# Patient Record
Sex: FEMALE | Race: WHITE | Employment: FULL TIME | URBAN - METROPOLITAN AREA
[De-identification: names, ages, dates, MRNs, and addresses within clinical notes are randomized per-mention and may not be internally consistent; named-entity substitution may affect disease eponyms.]

---

## 2017-11-21 ENCOUNTER — TELEPHONE (OUTPATIENT)
Dept: NEUROLOGY | Age: 47
End: 2017-11-21

## 2017-11-21 ENCOUNTER — OFFICE VISIT (OUTPATIENT)
Dept: NEUROLOGY | Age: 47
End: 2017-11-21
Payer: COMMERCIAL

## 2017-11-21 VITALS
DIASTOLIC BLOOD PRESSURE: 69 MMHG | BODY MASS INDEX: 38.61 KG/M2 | HEIGHT: 62 IN | SYSTOLIC BLOOD PRESSURE: 115 MMHG | HEART RATE: 68 BPM | WEIGHT: 209.8 LBS

## 2017-11-21 DIAGNOSIS — G35 MULTIPLE SCLEROSIS (HCC): Primary | ICD-10-CM

## 2017-11-21 DIAGNOSIS — H46.9 OPTIC NEURITIS: ICD-10-CM

## 2017-11-21 DIAGNOSIS — G43.009 MIGRAINE WITHOUT AURA AND WITHOUT STATUS MIGRAINOSUS, NOT INTRACTABLE: ICD-10-CM

## 2017-11-21 PROCEDURE — 99214 OFFICE O/P EST MOD 30 MIN: CPT | Performed by: NURSE PRACTITIONER

## 2017-11-21 RX ORDER — PREDNISONE 20 MG/1
TABLET ORAL
Qty: 18 TABLET | Refills: 0 | Status: SHIPPED | OUTPATIENT
Start: 2017-11-21

## 2017-11-21 RX ORDER — METHYLPREDNISOLONE, MICRONIZED 100 %
POWDER (GRAM) MISCELLANEOUS
Qty: 3 G | Refills: 0 | Status: SHIPPED | OUTPATIENT
Start: 2017-11-21

## 2017-11-21 NOTE — LETTER
MUSCULOSKELETAL Neck pain: absent, Back pain: absent, Stiffness: absent, Muscle pain: absent, Joint pain: absent Restless legs: absent   DERMATOLOGIC Hair loss: absent, Skin changes: absent   NEUROLOGIC Memory loss: absent, Confusion: absent, Seizures: absent Trouble walking or imbalance: absent, Dizziness: absent, Weakness: absent, Numbness: present Tremor: absent, Spasm: absent, Speech difficulty: absent, Headache: absent, Light sensitivity: present   PSYCHIATRIC Anxiety: absent, Hallucination: absent, Mood disorder: absent   HEMATOLOGIC Abnormal bleeding: absent, Anemia: absent, Clotting disorder: absent, Lymph gland changes: absent           Allergies   Allergen Reactions    Prochlorperazine Edisylate Other (See Comments)     LOCK JAW           Current Outpatient Prescriptions   Medication Sig Dispense Refill    dimethyl fumarate (TECFIDERA) 240 MG delayed release capsule Take 1 capsule by mouth 2 times daily 60 capsule 11    Multiple Vitamins-Minerals (THRIVE FOR LIFE WOMENS PO) Take by mouth daily Pill, shake and patch      Lysine 1000 MG TABS Take 1 tablet by mouth as needed       Omeprazole Magnesium (PRILOSEC OTC PO) Take  by mouth as needed. No current facility-administered medications for this visit. PHYSICAL EXAMINATION       There were no vitals filed for this visit. .                                                                                                    General Appearance:  Alert, cooperative, no signs of distress, appears stated age   Head:  Normocephalic, no signs of trauma   Eyes:  Conjunctiva/corneas clear;  eyelids intact.   Right optic disc pallor   Ears:  Normal external ear and canals   Nose: Nares normal, mucosa normal, no drainage    Throat: Lips and tongue normal; teeth normal;  gums normal   Neck: Supple, intact flexion, extension and rotation;   trachea midline;

## 2017-11-21 NOTE — PROGRESS NOTES
2014    - MRI of cervical spine with plaque within cord at C2-3 level, November 2006. -CSF analysis 4 WBC,0 RBC,total protein 43,glucose 57, oligoclonal bands negative ,IgG synthesis 11.2(-9.9-+3). - JOSE normal, LISA no potential OD . -Interferon neutralizing Ab <20.  B12 347, C reactive protein 7.9 (0-5) , folic acid 6.8 , DMS4U 6.0 , SPEP borderline polyclonal hypergammaglobunemia , ESR 10 , QUEENIE negative     Past Medical History:   Diagnosis Date    Arthritis     Depression     Migraine without aura, without mention of intractable migraine without mention of status migrainosus     Multiple sclerosis ([facility] [address]     Optic neuritis, unspecified          Past Surgical History:   Procedure Laterality Date    SHOULDER SURGERY      bilateral rotator cuff repair    TONSILLECTOMY         Family History   Problem Relation Age of Onset    Diabetes Father     Diabetes Paternal Uncle     Diabetes Paternal Grandmother     Heart Disease Paternal Grandmother        Social History   Substance Use Topics    Smoking status: Former Smoker    Smokeless tobacco: Never Used      Comment: social smoker in past    Alcohol use Yes      Comment: occasional use                               REVIEW OF SYSTEMS    CONSTITUTIONAL Weight: absent, Appetite: absent, Fatigue: present      HEENT Ears: normal, Visual disturbance: present   RESPIRATORY Shortness of breath: absent, Cough: absent   CARDIOVASCULAR Chest pain: absent, Leg swelling :absent      GI Constipation: absent, Diarrhea: absent, Swallowing change: absent       Urinary frequency: present, Urinary urgency: absent, Urinary incontinence: absent   MUSCULOSKELETAL Neck pain: absent, Back pain: absent, Stiffness: absent, Muscle pain: absent, Joint pain: absent Restless legs: absent   DERMATOLOGIC Hair loss: absent, Skin changes: absent   NEUROLOGIC Memory loss: absent, Confusion: absent, Seizures: absent Trouble walking or imbalance: absent, Dizziness: absent, Weakness: absent, Numbness: present Tremor: absent, Spasm: absent, Speech difficulty: absent, Headache: absent, Light sensitivity: present   PSYCHIATRIC Anxiety: absent, Hallucination: absent, Mood disorder: absent   HEMATOLOGIC Abnormal bleeding: absent, Anemia: absent, Clotting disorder: absent, Lymph gland changes: absent           Allergies   Allergen Reactions    Prochlorperazine Edisylate Other (See Comments)     LOCK JAW           Current Outpatient Prescriptions   Medication Sig Dispense Refill    dimethyl fumarate (TECFIDERA) 240 MG delayed release capsule Take 1 capsule by mouth 2 times daily 60 capsule 11    Multiple Vitamins-Minerals (THRIVE FOR LIFE WOMENS PO) Take by mouth daily Pill, shake and patch      Lysine 1000 MG TABS Take 1 tablet by mouth as needed       Omeprazole Magnesium (PRILOSEC OTC PO) Take  by mouth as needed. No current facility-administered medications for this visit. PHYSICAL EXAMINATION       There were no vitals filed for this visit. .                                                                                                    General Appearance:  Alert, cooperative, no signs of distress, appears stated age   Head:  Normocephalic, no signs of trauma   Eyes:  Conjunctiva/corneas clear;  eyelids intact.   Right optic disc pallor   Ears:  Normal external ear and canals   Nose: Nares normal, mucosa normal, no drainage    Throat: Lips and tongue normal; teeth normal;  gums normal   Neck: Supple, intact flexion, extension and rotation;   trachea midline;  no adenopathy;   thyroid: not enlarged;   no carotid pulse abnormality   Back:   Symmetric, no curvature, ROM adequate   Lungs:   Respirations unlabored   Heart:  Regular rate and rhythm           Extremities: Extremities normal, no cyanosis, no edema   Pulses: Symmetric over head and neck   Skin: Skin color, texture normal, no rashes, no lesions NEUROLOGIC EXAMINATION    Neurologic Exam     Mental Status   Oriented to person, place, and time. Attention: normal.   Speech: speech is normal   Level of consciousness: alert  Normal comprehension. Cranial Nerves     CN II   Visual fields full to confrontation. CN III, IV, VI   Pupils are equal, round, and reactive to light. Extraocular motions are normal.     CN V   Facial sensation intact. CN VII   Facial expression full, symmetric. CN VIII   CN VIII normal.     CN IX, X   CN IX normal.     CN XI   CN XI normal.     CN XII   CN XII normal.     Motor Exam   Muscle bulk: normal  Overall muscle tone: normal  Right arm pronator drift: absent    Strength   Strength 5/5 throughout. Sensory Exam   Light touch normal.   Vibration normal.   Pinprick normal.     Gait, Coordination, and Reflexes     Gait  Gait: normal    Coordination   Finger to nose coordination: normal  Heel to shin coordination: normal    Tremor   Resting tremor: absent    Reflexes   Right brachioradialis: 2+  Left brachioradialis: 2+  Right biceps: 2+  Left biceps: 2+  Right triceps: 2+  Left triceps: 2+  Right patellar: 1+  Left patellar: 2+  Right achilles: 1+  Left achilles: 2+  Right plantar: normal  Left plantar: normal  Right ankle clonus: absent  Left ankle clonus: absent            ASSESSMENT/PLAN:       In summary, your patient, Kaylah Betancur exhibits the following, with associated plan:    1. Right optic neuritis, confirmed by patient's neuro-ophthalmologist and possible exacerbation of relapsing remitting multiple sclerosis, with new sensory and gait symptoms  1. We will obtain an MRI of the brain with and without contrast with special attention to her optic nerve as well as an MRI of the cervical spine, as patient did have evidence of cervical spine involvement in 2006.  2. We will administer Solu-Medrol 1 g ×3 days orally, followed by a prednisone taper.   3. She will take Pepcid as a GI prophylactic  4. She has an appointment with Dr. Conrado Gomez in 3 weeks, and will follow up with him regarding her new symptoms and MRIs  5. For now, she will remain on Tecfidera 240 mg twice daily.   She will need annual liver function studies and CBC at her routine visit            Signed: Rand Barnes CNP

## 2017-11-27 DIAGNOSIS — G35 MULTIPLE SCLEROSIS (HCC): ICD-10-CM

## 2017-11-27 RX ORDER — DIMETHYL FUMARATE 240 MG/1
240 CAPSULE ORAL 2 TIMES DAILY
Qty: 60 CAPSULE | Status: SHIPPED | OUTPATIENT
Start: 2017-11-27 | End: 2018-11-27

## 2017-12-02 ENCOUNTER — HOSPITAL ENCOUNTER (OUTPATIENT)
Dept: MRI IMAGING | Age: 47
Discharge: HOME | End: 2017-12-02
Payer: COMMERCIAL

## 2017-12-02 DIAGNOSIS — G35 MULTIPLE SCLEROSIS (HCC): ICD-10-CM

## 2017-12-02 PROCEDURE — 72156 MRI NECK SPINE W/O & W/DYE: CPT

## 2017-12-02 PROCEDURE — A9579 GAD-BASE MR CONTRAST NOS,1ML: HCPCS | Performed by: NURSE PRACTITIONER

## 2017-12-02 PROCEDURE — 70553 MRI BRAIN STEM W/O & W/DYE: CPT

## 2017-12-02 PROCEDURE — 6360000004 HC RX CONTRAST MEDICATION: Performed by: NURSE PRACTITIONER

## 2017-12-02 RX ADMIN — GADOTERIDOL 20 ML: 279.3 INJECTION, SOLUTION INTRAVENOUS at 08:15

## 2017-12-11 ENCOUNTER — OFFICE VISIT (OUTPATIENT)
Dept: NEUROLOGY | Age: 47
End: 2017-12-11
Payer: COMMERCIAL

## 2017-12-11 VITALS
HEIGHT: 62 IN | SYSTOLIC BLOOD PRESSURE: 99 MMHG | WEIGHT: 209.2 LBS | BODY MASS INDEX: 38.5 KG/M2 | DIASTOLIC BLOOD PRESSURE: 67 MMHG | HEART RATE: 92 BPM

## 2017-12-11 DIAGNOSIS — G43.009 MIGRAINE WITHOUT AURA AND WITHOUT STATUS MIGRAINOSUS, NOT INTRACTABLE: ICD-10-CM

## 2017-12-11 DIAGNOSIS — G35 MULTIPLE SCLEROSIS (HCC): Primary | ICD-10-CM

## 2017-12-11 PROCEDURE — 99214 OFFICE O/P EST MOD 30 MIN: CPT | Performed by: PSYCHIATRY & NEUROLOGY

## 2017-12-11 RX ORDER — DIMETHYL FUMARATE 240 MG/1
240 CAPSULE ORAL 2 TIMES DAILY
Qty: 60 CAPSULE | Refills: 11 | Status: SHIPPED | OUTPATIENT
Start: 2017-12-11 | End: 2022-04-25 | Stop reason: ALTCHOICE

## 2017-12-11 ASSESSMENT — ENCOUNTER SYMPTOMS
RESPIRATORY NEGATIVE: 1
ALLERGIC/IMMUNOLOGIC NEGATIVE: 1
GASTROINTESTINAL NEGATIVE: 1

## 2017-12-11 NOTE — LETTER
OD . Interferon neutralizing Ab <20. B12 347, C reactive protein 7.9 (0-5) , folic acid 6.8 , HFN1U 6.0 , SPEP borderline polyclonal hypergammaglobunemia , ESR 10 , QUEENIE negative , November 2014 MRI of Head with stable multifocal scattered T2/FLAIR hyperintensities instable appearing periventricular along with subcortical white matter nonenhancing, December 2017 . MRI of cervical spine with foci of abnormal cord signal C2-3  disc level and C6 vertebral body level and T3 level . There is multi level degenerative spondylosis , December 2017       Past Medical History:   Diagnosis Date    Arthritis     Depression     Migraine without aura, without mention of intractable migraine without mention of status migrainosus     [facility] sclerosis ([facility] [address]     Optic neuritis, unspecified        Past Surgical History:   Procedure Laterality Date    SHOULDER SURGERY      bilateral rotator cuff repair    TONSILLECTOMY         Family History   Problem Relation Age of Onset    Diabetes Father     Diabetes Paternal Uncle     Diabetes Paternal Grandmother     Heart Disease Paternal Grandmother        Social History     Social History    Marital status:      Spouse name: N/A    Number of children: N/A    Years of education: N/A     Social History Main Topics    Smoking status: Former Smoker    Smokeless tobacco: Never Used      Comment: social smoker in past    Alcohol use Yes      Comment: occasional use    Drug use: No    Sexual activity: Not Asked     Other Topics Concern    None     Social History Narrative    None       Current Outpatient Prescriptions   Medication Sig Dispense Refill    dimethyl fumarate (TECFIDERA) 240 MG delayed release capsule Take 1 capsule by mouth 2 times daily 60 capsule 11    TECFIDERA 240 MG delayed release capsule TAKE 1 CAPSULE BY MOUTH 2 TIMES DAILY 60 capsule PRN    MethylPREDNISolone POWD 1000 mg qd x 3 days. Put in 250 mg. per capsule.  3 g 0  predniSONE (DELTASONE) 20 MG tablet 3 qd for 3 days, 2 qd x3 days, 1 qd x 3 day 18 tablet 0    Multiple Vitamins-Minerals (THRIVE FOR LIFE WOMENS PO) Take by mouth daily Pill, shake and patch      Lysine 1000 MG TABS Take 1 tablet by mouth as needed       Omeprazole Magnesium (PRILOSEC OTC PO) Take  by mouth as needed. No current facility-administered medications for this visit. Allergies   Allergen Reactions    Prochlorperazine Edisylate Other (See Comments)     LOCK JAW           Review of Systems   Constitutional: Negative. HENT: Negative. Eyes: Positive for visual disturbance. Respiratory: Negative. Cardiovascular: Negative. Gastrointestinal: Negative. Endocrine: Negative. Genitourinary: Positive for frequency. Musculoskeletal: Negative. Skin: Negative. Allergic/Immunologic: Negative. Neurological: Positive for numbness. Hematological: Negative. Psychiatric/Behavioral: Negative. Objective:   Physical Exam    Vitals:    12/11/17 1242   BP: 99/67   Pulse: 92       Neurological Examination  Constitutional .General exam well groomed   Ears /Nose/Throat: external ear . Normal exam  Neck and thyroid . Normal size  Respiratory . Breathsounds clear bilaterally  Cardiovascular: Auscultation of heart with regular rate and rhythm and normal heart sounds  Musculoskeletal. Muscle bulk and tone normal                                                                 Muscle strength 5/5 strength throughout                                                                                 No dysmetria or dysdiadokinesis  No tremor   Normal fine motor  Gait normal   Orientation Alert and oriented x 3   Language process and speech normal . No aphasia   Cranial nerve 2 acuety left eye 20/20, right eye 20/25 and visual fields normal   Cranial nerve 3, 4 and 6 . Extraocular muscles are intact .  Pupils are equal and reactive

## 2017-12-11 NOTE — PROGRESS NOTES
spondylosis , December 2017       Past Medical History:   Diagnosis Date    Arthritis     Depression     Migraine without aura, without mention of intractable migraine without mention of status migrainosus     Multiple sclerosis ([facility] [address]     Optic neuritis, unspecified        Past Surgical History:   Procedure Laterality Date    SHOULDER SURGERY      bilateral rotator cuff repair    TONSILLECTOMY         Family History   Problem Relation Age of Onset    Diabetes Father     Diabetes Paternal Uncle     Diabetes Paternal Grandmother     Heart Disease Paternal Grandmother        Social History     Social History    Marital status:      Spouse name: N/A    Number of children: N/A    Years of education: N/A     Social History Main Topics    Smoking status: Former Smoker    Smokeless tobacco: Never Used      Comment: social smoker in past    Alcohol use Yes      Comment: occasional use    Drug use: No    Sexual activity: Not Asked     Other Topics Concern    None     Social History Narrative    None       Current Outpatient Prescriptions   Medication Sig Dispense Refill    dimethyl fumarate (TECFIDERA) 240 MG delayed release capsule Take 1 capsule by mouth 2 times daily 60 capsule 11    TECFIDERA 240 MG delayed release capsule TAKE 1 CAPSULE BY MOUTH 2 TIMES DAILY 60 capsule PRN    MethylPREDNISolone POWD 1000 mg qd x 3 days. Put in 250 mg. per capsule. 3 g 0    predniSONE (DELTASONE) 20 MG tablet 3 qd for 3 days, 2 qd x3 days, 1 qd x 3 day 18 tablet 0    Multiple Vitamins-Minerals (THRIVE FOR LIFE WOMENS PO) Take by mouth daily Pill, shake and patch      Lysine 1000 MG TABS Take 1 tablet by mouth as needed       Omeprazole Magnesium (PRILOSEC OTC PO) Take  by mouth as needed. No current facility-administered medications for this visit.         Allergies   Allergen Reactions    Prochlorperazine Edisylate Other (See Comments)     LOCK JAW           Review of Systems   Constitutional: Negative. HENT: Negative. Eyes: Positive for visual disturbance. Respiratory: Negative. Cardiovascular: Negative. Gastrointestinal: Negative. Endocrine: Negative. Genitourinary: Positive for frequency. Musculoskeletal: Negative. Skin: Negative. Allergic/Immunologic: Negative. Neurological: Positive for numbness. Hematological: Negative. Psychiatric/Behavioral: Negative. Objective:   Physical Exam    Vitals:    12/11/17 1242   BP: 99/67   Pulse: 92       Neurological Examination  Constitutional .General exam well groomed   Ears /Nose/Throat: external ear . Normal exam  Neck and thyroid . Normal size  Respiratory . Breathsounds clear bilaterally  Cardiovascular: Auscultation of heart with regular rate and rhythm and normal heart sounds  Musculoskeletal. Muscle bulk and tone normal                                                                 Muscle strength 5/5 strength throughout                                                                                 No dysmetria or dysdiadokinesis  No tremor   Normal fine motor  Gait normal   Orientation Alert and oriented x 3   Language process and speech normal . No aphasia   Cranial nerve 2 acuety left eye 20/20, right eye 20/25 and visual fields normal   Cranial nerve 3, 4 and 6 . Extraocular muscles are intact . Pupils are equal and reactive   Cranial nerve 5 . Normal strength of masseter and temporalis . Intact corneal reflex. Normal facial sensation  Cranial nerve 7 normal exam   Cranial nerve 8. Grossly intact hearing   Cranial nerve 9 and 10. Symmetric palate elevation   Cranial nerve 11 , 5 out of 5 strength   Cranial Nerve 12 midline tongue . No atrophy  Sensation . Normal proprioception . Intact Vibration . Normal pinprick and light touch   Deep Tendon Reflexes normal  Plantar response flexor bilaterally      Assessment:       1. Multiple sclerosis ([address]    2.  Migraine without aura and without status migrainosus, not intractable    Patient has had rght eye optic neuritis on tecfidera resolved with medrol pulse therapy .  Patient is to get UA but also would like her assessed by [facility] at [facility] not able to go to [facility] again as per her insurance with some new plaques on MRI of cervical spine and excacerbation of optic neuritis on tecfidera      Plan:         Orders Placed This Encounter   Procedures    Urinalysis     with urine C&S    External Referral To Neurology     Referral Priority:   Routine     Referral Type:   Consult for Advice and Opinion     Referral Reason:   Specialty Services Required     Requested Specialty:   Neurology     Number of Visits Requested:   1

## 2017-12-13 NOTE — COMMUNICATION BODY
Negative. HENT: Negative. Eyes: Positive for visual disturbance. Respiratory: Negative. Cardiovascular: Negative. Gastrointestinal: Negative. Endocrine: Negative. Genitourinary: Positive for frequency. Musculoskeletal: Negative. Skin: Negative. Allergic/Immunologic: Negative. Neurological: Positive for numbness. Hematological: Negative. Psychiatric/Behavioral: Negative. Objective:   Physical Exam    Vitals:    12/11/17 1242   BP: 99/67   Pulse: 92       Neurological Examination  Constitutional .General exam well groomed   Ears /Nose/Throat: external ear . Normal exam  Neck and thyroid . Normal size  Respiratory . Breathsounds clear bilaterally  Cardiovascular: Auscultation of heart with regular rate and rhythm and normal heart sounds  Musculoskeletal. Muscle bulk and tone normal                                                                 Muscle strength 5/5 strength throughout                                                                                 No dysmetria or dysdiadokinesis  No tremor   Normal fine motor  Gait normal   Orientation Alert and oriented x 3   Language process and speech normal . No aphasia   Cranial nerve 2 acuety left eye 20/20, right eye 20/25 and visual fields normal   Cranial nerve 3, 4 and 6 . Extraocular muscles are intact . Pupils are equal and reactive   Cranial nerve 5 . Normal strength of masseter and temporalis . Intact corneal reflex. Normal facial sensation  Cranial nerve 7 normal exam   Cranial nerve 8. Grossly intact hearing   Cranial nerve 9 and 10. Symmetric palate elevation   Cranial nerve 11 , 5 out of 5 strength   Cranial Nerve 12 midline tongue . No atrophy  Sensation . Normal proprioception . Intact Vibration . Normal pinprick and light touch   Deep Tendon Reflexes normal  Plantar response flexor bilaterally      Assessment:       1. Multiple sclerosis ([address]    2.  Migraine without aura and without status migrainosus, not

## 2022-03-02 ENCOUNTER — OFFICE VISIT (OUTPATIENT)
Dept: NEUROLOGY | Age: 52
End: 2022-03-02
Payer: COMMERCIAL

## 2022-03-02 VITALS
HEIGHT: 63 IN | DIASTOLIC BLOOD PRESSURE: 82 MMHG | BODY MASS INDEX: 41.64 KG/M2 | HEART RATE: 77 BPM | TEMPERATURE: 97.2 F | SYSTOLIC BLOOD PRESSURE: 129 MMHG | WEIGHT: 235 LBS

## 2022-03-02 DIAGNOSIS — G35 MULTIPLE SCLEROSIS (HCC): Primary | ICD-10-CM

## 2022-03-02 DIAGNOSIS — N31.9 NEUROGENIC BLADDER: ICD-10-CM

## 2022-03-02 PROCEDURE — 99204 OFFICE O/P NEW MOD 45 MIN: CPT | Performed by: PSYCHIATRY & NEUROLOGY

## 2022-03-02 ASSESSMENT — ENCOUNTER SYMPTOMS
GASTROINTESTINAL NEGATIVE: 1
ALLERGIC/IMMUNOLOGIC NEGATIVE: 1
RESPIRATORY NEGATIVE: 1

## 2022-03-02 NOTE — PROGRESS NOTES
Subjective:      Patient ID: Honey Knight is a 46 y.o. female. HPI  Active Problem relapsing remitting multiple sclerosis with prior optic neuritis having been on tecfidera having gotten [facility] second opinion last seen in December 2017 having folowed at [facility] with Dr Lexy Santoyo . There are also common migraine headaches. The condition is she was taken off the tecfidera in March of 2020 do to very low total lymphocyte count going as as low as 0.2 with with las total lymphocyte count being 0.6 in October 2021 . There was discussion at [facility]  to switch over to ocrevus . 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 There have been no exacerbations since last seen in December 2017 . She is having tinling in arms and legs . She went to [facility] do to insurance change finding [facility] to be difficult to work with . There is mild balance complaint with no focal motor, sensory , bulbar or visual complaints . There is history of migraine with no headaches at this time. She reports she has trouble determining whet she is done urinating . She has had  right eye optic November 2017 along with prior numbness of outer right calf and foot  . Significant medications  zomig 5 mg PRN. Previously on avonex and tecfidera   Testing MRI of Head with stable appearing periventricular along with subcortical nonenhancing plaques, March 2014  . MRI of cervical spine with plaque within cord at C2-3 level, November 2006. CSF analysis 4 WBC,0 RBC,total protein 43,glucose 57, oligoclonal bands negative ,IgG synthesis 11.2(-9.9-+3). JOSE normal, LISA no potential OD . Interferon neutralizing Ab <20. B12 347, C reactive protein 7.9 (0-5) , folic acid 6.8 , IOB1N 6.0 , SPEP borderline polyclonal hypergammaglobunemia , ESR 10 , QUEENIE negative , November 2014 MRI of Head with stable multifocal scattered T2/FLAIR hyperintensities instable appearing periventricular along with subcortical white matter nonenhancing, December 2017 .  MRI of cervical spine with foci of abnormal cord signal C2-3  disc level and C6 vertebral body level and T3 level . There is multi level degenerative spondylosis , December 2017  . MRI of Head nonehancing supra and inratentorial brain parenchyma T2 white mater hyperintensities  , July 2019 . MRI cervical spine ill defined T2 hyperintensities at C2 and C6 level . Mild multi level degenertaive cahnges , July 2019 . Total absolute lyphocytes count 0.6 , October 2021      Past Medical History:   Diagnosis Date    Arthritis     Depression     Migraine without aura, without mention of intractable migraine without mention of status migrainosus     Multiple sclerosis ([facility] [address]     Optic neuritis, unspecified        Past Surgical History:   Procedure Laterality Date    SHOULDER SURGERY      bilateral rotator cuff repair    TONSILLECTOMY         Family History   Problem Relation Age of Onset    Diabetes Father     Diabetes Paternal Uncle     Diabetes Paternal Grandmother     Heart Disease Paternal Grandmother        Social History     Socioeconomic History    Marital status:      Spouse name: None    Number of children: None    Years of education: None    Highest education level: None   Occupational History    None   Tobacco Use    Smoking status: Former Smoker    Smokeless tobacco: Never Used    Tobacco comment: social smoker in past   Vaping Use    Vaping Use: Never used   Substance and Sexual Activity    Alcohol use: Yes     Comment: occasional use    Drug use: No    Sexual activity: None   Other Topics Concern    None   Social History Narrative    None     Social Determinants of Health     Financial Resource Strain:     Difficulty of Paying Living Expenses: Not on file   Food Insecurity:     Worried About Running Out of Food in the Last Year: Not on file    Javon of Food in the Last Year: Not on file   Transportation Needs:     Lack of Transportation (Medical):  Not on file    Lack of Transportation (Non-Medical): Not on file   Physical Activity:     Days of Exercise per Week: Not on file    Minutes of Exercise per Session: Not on file   Stress:     Feeling of Stress : Not on file   Social Connections:     Frequency of Communication with Friends and Family: Not on file    Frequency of Social Gatherings with Friends and Family: Not on file    Attends Druze Services: Not on file    Active Member of [address] [place] [address] or Organizations: Not on file    Attends Club or Organization Meetings: Not on file    Marital Status: Not on file   Intimate Partner Violence:     Fear of Current or Ex-Partner: Not on file    Emotionally Abused: Not on file    Physically Abused: Not on file    Sexually Abused: Not on file   Housing Stability:     Unable to Pay for Housing in the Last Year: Not on file    Number of [place] in the Last Year: Not on file    Unstable Housing in the Last Year: Not on file       Current Outpatient Medications   Medication Sig Dispense Refill    Cholecalciferol (VITAMIN D3) 125 MCG (5000 UT) TABS Take by mouth      TURMERIC PO Take by mouth      Multiple Vitamins-Minerals (THRIVE FOR LIFE WOMENS PO) Take by mouth daily Pill, shake and patch      Lysine 1000 MG TABS Take 1 tablet by mouth as needed       dimethyl fumarate (TECFIDERA) 240 MG delayed release capsule Take 1 capsule by mouth 2 times daily 60 capsule 11    TECFIDERA 240 MG delayed release capsule TAKE 1 CAPSULE BY MOUTH 2 TIMES DAILY (Patient not taking: Reported on 3/2/2022) 60 capsule PRN    MethylPREDNISolone POWD 1000 mg qd x 3 days. Put in 250 mg. per capsule. (Patient not taking: Reported on 3/2/2022) 3 g 0    predniSONE (DELTASONE) 20 MG tablet 3 qd for 3 days, 2 qd x3 days, 1 qd x 3 day (Patient not taking: Reported on 3/2/2022) 18 tablet 0     No current facility-administered medications for this visit.        Allergies   Allergen Reactions    Prochlorperazine Edisylate Other (See Comments)     LOCK JAW Review of Systems   Constitutional: Negative. HENT: Negative. Eyes: Positive for visual disturbance. Respiratory: Negative. Cardiovascular: Negative. Gastrointestinal: Negative. Endocrine: Negative. Genitourinary: Positive for frequency. Musculoskeletal: Negative. Skin: Negative. Allergic/Immunologic: Negative. Neurological: Positive for numbness. Hematological: Negative. Psychiatric/Behavioral: Negative. Objective:   Physical Exam  Vitals:    03/02/22 0917   BP: 129/82   Pulse: 77   Temp: 97.2 °F (36.2 °C)       Neurological Examination  Constitutional .General exam well groomed   Ears /Nose/Throat: external ear . Normal exam  Neck and thyroid . Normal size  Respiratory . Breathsounds clear bilaterally  Cardiovascular: Auscultation of heart with regular rate and rhythm and normal heart sounds  Musculoskeletal. Muscle bulk and tone normal                                                                 Muscle strength 5/5 strength throughout                                                                                 No dysmetria or dysdiadokinesis  No tremor   Normal fine motor  Gait normal   Orientation Alert and oriented x 3   Language process and speech normal . No aphasia   Cranial nerve 2 acuety left eye 20/20, right eye 20/25 and visual fields normal   Cranial nerve 3, 4 and 6 . Extraocular muscles are intact . Pupils are equal and reactive   Cranial nerve 5 .. Intact corneal reflex. Normal facial sensation  Cranial nerve 7 normal exam   Cranial nerve 8. Grossly intact hearing   Cranial nerve 9 and 10. Symmetric palate elevation   Cranial nerve 11 , 5 out of 5 strength   Cranial Nerve 12 midline tongue . No atrophy  Sensation . Normal proprioception . Intact Vibration . Normal pinprick and light touch   Deep Tendon Reflexes normal  Plantar response flexor bilaterally      Assessment:       1. Multiple sclerosis ([address]    2.  Neurogenic bladder    She has been taken of tecfidera with low total absolute lymphocyte count that appears to have improved . Will have her undergo MRI of Head and cervical spine with repeat CBC and LFT's for consideration of initiating ocrevus . Will also make referral to urology for neurogenic bladder      Plan:         Orders Placed This Encounter   Procedures    MRI BRAIN W WO CONTRAST     Standing Status:   Future     Standing Expiration Date:   3/2/2023     Order Specific Question:   STAT Creatinine as needed:     Answer: Yes    MRI CERVICAL SPINE W WO CONTRAST     Standing Status:   Future     Standing Expiration Date:   3/2/2023     Order Specific Question:   STAT Creatinine as needed:     Answer:    Yes    CBC with Auto Differential     Standing Status:   Future     Standing Expiration Date:   3/2/2023    ALT     Standing Status:   Future     Standing Expiration Date:   3/2/2023    AST     Standing Status:   Future     Standing Expiration Date:   3/2/2023    LONNIE Andrade MD, Urology, [place]     Referral Priority:   Routine     Referral Type:   Eval and Treat     Referral Reason:   Specialty Services Required     Referred to Provider:   Jamila Mendez MD     Requested Specialty:   Urology     Number of Visits Requested:   1

## 2022-03-23 ENCOUNTER — TELEPHONE (OUTPATIENT)
Dept: NEUROLOGY | Age: 52
End: 2022-03-23

## 2022-04-18 DIAGNOSIS — G35 MULTIPLE SCLEROSIS (HCC): ICD-10-CM

## 2022-04-18 DIAGNOSIS — N31.9 NEUROGENIC BLADDER: ICD-10-CM

## 2022-04-25 ENCOUNTER — TELEPHONE (OUTPATIENT)
Dept: NEUROLOGY | Age: 52
End: 2022-04-25

## 2022-04-25 DIAGNOSIS — G35 MULTIPLE SCLEROSIS (HCC): Primary | ICD-10-CM

## 2022-04-25 NOTE — TELEPHONE ENCOUNTER
----- Message from Amor Liz MD sent at 4/19/2022  5:35 PM EDT -----  Message to Gretchen Richards . Please let her know MRI of Head with white matter changes with no active enhancing lesion . MRI of cervical spine with some arthritic changes with no cord changes .  Would favor at this tie going for approval process for ocrevus getting lab work recquired for this gemma if patient is in agreement

## 2022-04-25 NOTE — TELEPHONE ENCOUNTER
I called [facility] Raimundo. She is agreeable to starting OCrevus. She is not certain about insurance. Will review with Dr. Joon Chase the labs she has had done to see if anything further needs to be done.

## 2022-04-27 NOTE — TELEPHONE ENCOUNTER
Dr Brett Melara is ordering repeat Hep B, TB Gold, Varicelles to be sure before the Ocrevus infusion. She had recent liver function testing. I called Sandra Carlisle and she is agreeable.  She faxed forms to me to complete and send to [facility]

## 2022-04-28 ENCOUNTER — TELEPHONE (OUTPATIENT)
Dept: NEUROLOGY | Age: 52
End: 2022-04-28

## 2022-04-28 DIAGNOSIS — G35 MULTIPLE SCLEROSIS (HCC): Primary | ICD-10-CM

## 2022-04-28 RX ORDER — SODIUM CHLORIDE 9 MG/ML
25 INJECTION, SOLUTION INTRAVENOUS PRN
OUTPATIENT
Start: 2022-05-02

## 2022-04-28 RX ORDER — SODIUM CHLORIDE 0.9 % (FLUSH) 0.9 %
5-40 SYRINGE (ML) INJECTION PRN
OUTPATIENT
Start: 2022-05-02

## 2022-04-28 RX ORDER — ONDANSETRON 2 MG/ML
8 INJECTION INTRAMUSCULAR; INTRAVENOUS
OUTPATIENT
Start: 2022-05-02

## 2022-04-28 RX ORDER — METHYLPREDNISOLONE SODIUM SUCCINATE 125 MG/2ML
100 INJECTION, POWDER, LYOPHILIZED, FOR SOLUTION INTRAMUSCULAR; INTRAVENOUS ONCE
OUTPATIENT
Start: 2022-05-02

## 2022-04-28 RX ORDER — FAMOTIDINE 10 MG/ML
20 INJECTION, SOLUTION INTRAVENOUS
OUTPATIENT
Start: 2022-05-02

## 2022-04-28 RX ORDER — SODIUM CHLORIDE 9 MG/ML
5-250 INJECTION, SOLUTION INTRAVENOUS PRN
OUTPATIENT
Start: 2022-05-02

## 2022-04-28 RX ORDER — ACETAMINOPHEN 325 MG/1
650 TABLET ORAL ONCE
OUTPATIENT
Start: 2022-05-02

## 2022-04-28 RX ORDER — ACETAMINOPHEN 325 MG/1
650 TABLET ORAL
OUTPATIENT
Start: 2022-05-02

## 2022-04-28 RX ORDER — DIPHENHYDRAMINE HYDROCHLORIDE 50 MG/ML
50 INJECTION INTRAMUSCULAR; INTRAVENOUS
OUTPATIENT
Start: 2022-05-02

## 2022-04-28 RX ORDER — SODIUM CHLORIDE 9 MG/ML
INJECTION, SOLUTION INTRAVENOUS CONTINUOUS
OUTPATIENT
Start: 2022-05-02

## 2022-04-28 RX ORDER — EPINEPHRINE 1 MG/ML
0.3 INJECTION, SOLUTION, CONCENTRATE INTRAVENOUS PRN
OUTPATIENT
Start: 2022-05-02

## 2022-04-28 RX ORDER — DIPHENHYDRAMINE HYDROCHLORIDE 50 MG/ML
25 INJECTION INTRAMUSCULAR; INTRAVENOUS ONCE
OUTPATIENT
Start: 2022-05-02

## 2022-04-28 RX ORDER — ALBUTEROL SULFATE 90 UG/1
4 AEROSOL, METERED RESPIRATORY (INHALATION) PRN
OUTPATIENT
Start: 2022-05-02

## 2022-04-28 RX ORDER — HEPARIN SODIUM (PORCINE) LOCK FLUSH IV SOLN 100 UNIT/ML 100 UNIT/ML
500 SOLUTION INTRAVENOUS PRN
OUTPATIENT
Start: 2022-05-02

## 2022-04-28 NOTE — TELEPHONE ENCOUNTER
Duglas Bryan will go to [facility]. [facility] for the Ocrevus infusion. I pulled in [id number] therapy plan. Please sign it.

## 2022-05-02 NOTE — TELEPHONE ENCOUNTER
Received a call from [facility] with [facility] for [facility]. Valarie Sawyer said that [employer] is out of network for her plan. Will discuss with Dr. Kassidy Odell to see if he prefers an outpt. infusion center or home infusion. I called Rm Hewitt and let her know this. she is going to call Jori to find out who is in plan for outpt. infusions.

## 2022-05-03 DIAGNOSIS — G35 MULTIPLE SCLEROSIS (HCC): ICD-10-CM

## 2022-05-05 ENCOUNTER — TELEPHONE (OUTPATIENT)
Dept: NEUROLOGY | Age: 52
End: 2022-05-05

## 2022-05-05 NOTE — TELEPHONE ENCOUNTER
----- Message from Nishant Mendez MD sent at 5/4/2022  5:31 PM EDT -----  Message to Mackenzie Mederos . Bloodwork looks good . Negative quantiferon with no TB exposure . Hepatitis battery negative having prior Herpes zozer exposure .  Would start paperwork for ocrevus approval

## 2022-05-05 NOTE — TELEPHONE ENCOUNTER
Juanpablo Hernandez with [facility] is not in network for Lissett Alfonso. Lissett Alfonso was going to contact her insurance to see who [facility] does allow.

## 2022-05-09 ENCOUNTER — TELEPHONE (OUTPATIENT)
Dept: NEUROLOGY | Age: 52
End: 2022-05-09

## 2022-05-09 NOTE — TELEPHONE ENCOUNTER
Enrico Bella called in and let me know that she can go to [facility]fusion in [place] for the [address] [place] [address]. I explained we will send them the orders and then they will do a precert with insurance. She did mention that she is prone to get cold sores on her lips or just inside her nose form time to time. She said she has some right now. She wondered if there is anything she can or should do preventably before the Ocrevus infusion and I told her I can check with Dr. Magdalene Sr.

## 2022-05-10 NOTE — TELEPHONE ENCOUNTER
Roxi Melvin called me back. She can use "[employer]. I called "[employer]" at [phone] and spoke with Verónica Castano.  She asked me to fax the referral to [phone]

## 2022-05-17 ENCOUNTER — TELEPHONE (OUTPATIENT)
Dept: NEUROLOGY | Age: 52
End: 2022-05-17

## 2022-05-17 NOTE — TELEPHONE ENCOUNTER
[facility] called. They need their order form completed for the Ocrevus. Completed and signed by Dr. Brett Melara and faxed back.

## 2022-06-09 ENCOUNTER — TELEPHONE (OUTPATIENT)
Dept: NEUROLOGY | Age: 52
End: 2022-06-09

## 2022-06-10 NOTE — TELEPHONE ENCOUNTER
Y:[phone]    Does the pt. Need to receive the entire loading dose? She had received only a partial loading dose at the time of the reaction. Please place order for rate of infusion with benadryl and fax back to [facility].  Thanks

## 2022-06-10 NOTE — TELEPHONE ENCOUNTER
Message to nurse .  Would do slower ing fusion rate with solumedrol 10 mg and benadryl 50 mg IVP before infusion

## 2022-06-10 NOTE — TELEPHONE ENCOUNTER
Message to nurse .  Call infusion Centr and have her [remedicated with benadryl 50 mg befire next infusion and have them slow down rate of infusion

## 2022-06-15 NOTE — TELEPHONE ENCOUNTER
I gave Mala Starks the message. she is agreeable. She said she got about half of the first dose of the infusion of Ocrevus. If she is able to tolerate this next dose, full infusion will that be all she will get then until 6 months from now?

## 2022-06-15 NOTE — TELEPHONE ENCOUNTER
I called and spoke with Venice Bernheim RN at [facility] and gave her the order. she said she will need a written order.  I explained Dr. Suzanne Pickett is out of the office this week so we will fax it next week to [phone]

## 2022-06-20 NOTE — TELEPHONE ENCOUNTER
Neo Arevalo please sen order for slower ocrevus infusion and would do slower ing fusion rate with solumedrol 10 mg and benadryl 50 mg IVP before infusion

## 2022-06-21 RX ORDER — OCRELIZUMAB 300 MG/10ML
300 INJECTION INTRAVENOUS ONCE
Qty: 10 ML | Refills: 0 | Status: SHIPPED | OUTPATIENT
Start: 2022-06-21 | End: 2022-06-21

## 2022-06-21 NOTE — TELEPHONE ENCOUNTER
Discussed with Dr. Evi Mosher. Dose of Solumedrol is 100 mg. He asked that they slow infusion to 6 hours. I called Lucila Leiva and advised of this. Faxed order to her. I also called Mauro Blandon to let he rknow. She voiced understanding.

## 2022-08-23 ENCOUNTER — TELEPHONE (OUTPATIENT)
Dept: NEUROLOGY | Age: 52
End: 2022-08-23

## 2022-11-17 ENCOUNTER — TELEPHONE (OUTPATIENT)
Dept: NEUROLOGY | Age: 52
End: 2022-11-17

## 2022-11-17 ENCOUNTER — OFFICE VISIT (OUTPATIENT)
Dept: NEUROLOGY | Age: 52
End: 2022-11-17
Payer: COMMERCIAL

## 2022-11-17 VITALS
BODY MASS INDEX: 41.14 KG/M2 | HEART RATE: 76 BPM | HEIGHT: 63 IN | SYSTOLIC BLOOD PRESSURE: 117 MMHG | DIASTOLIC BLOOD PRESSURE: 75 MMHG | WEIGHT: 232.2 LBS

## 2022-11-17 DIAGNOSIS — G35 MULTIPLE SCLEROSIS (HCC): Primary | ICD-10-CM

## 2022-11-17 DIAGNOSIS — G43.009 MIGRAINE WITHOUT AURA AND WITHOUT STATUS MIGRAINOSUS, NOT INTRACTABLE: ICD-10-CM

## 2022-11-17 PROCEDURE — 99214 OFFICE O/P EST MOD 30 MIN: CPT | Performed by: PSYCHIATRY & NEUROLOGY

## 2022-11-17 RX ORDER — SUMATRIPTAN 50 MG/1
TABLET, FILM COATED ORAL
COMMUNITY
Start: 2021-08-25

## 2022-11-17 RX ORDER — OCRELIZUMAB 300 MG/10ML
600 INJECTION INTRAVENOUS
COMMUNITY

## 2022-11-17 RX ORDER — SUMATRIPTAN 50 MG/1
TABLET, FILM COATED ORAL
Qty: 9 TABLET | Refills: 3 | Status: SHIPPED | OUTPATIENT
Start: 2022-11-17

## 2022-11-17 ASSESSMENT — ENCOUNTER SYMPTOMS
GASTROINTESTINAL NEGATIVE: 1
ALLERGIC/IMMUNOLOGIC NEGATIVE: 1
RESPIRATORY NEGATIVE: 1

## 2022-11-17 NOTE — PROGRESS NOTES
Subjective:      Patient ID: Keke Gilliam is a 46 y.o. female. HPI  Active Problem relapsing remitting multiple sclerosis placed on ocrevus . Common migraine headaches. Neurogenic bladder. The condition is she had initial reaction with ocrevus with tightening of her throat attenuated with benadryl given tylenol and prednisone  . She had second infusion given over six hours tolerating this well . There is mild baseline imbalance with no focal motor, sensory , bulbar or visual complaints . There is urinary urgency . Her memory is doing well . She had headache three months ago over occiital head of pressure throbbing grade 7 over 10 attenauted with imitrex the last large headache being 2 years ago . 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 There have been no exacerbations since last seen in December 2017 . She is having tingling in arms and legs . She went to [facility] do to insurance change finding [facility] to be difficult to work with . There is mild balance complaint with no focal motor, sensory , bulbar or visual complaints . There is history of migraine with no headaches at this time. She has had  right eye optic November 2017 along with prior numbness of outer right calf and foot  . Significant medications imitrex 50 mg PRN, ocrevus q 6 months . Previously on avonex and tecfidera   Testing MRI of Head with stable appearing periventricular along with subcortical nonenhancing plaques, March 2014  . MRI of cervical spine with plaque within cord at C2-3 level, November 2006. CSF analysis 4 WBC,0 RBC,total protein 43,glucose 57, oligoclonal bands negative ,IgG synthesis 11.2(-9.9-+3). JOSE normal, LISA no potential OD . Interferon neutralizing Ab <20.  B12 347, C reactive protein 7.9 (0-5) , folic acid 6.8 , KDE9P 6.0 , SPEP borderline polyclonal hypergammaglobunemia , ESR 10 , QUEENIE negative , November 2014 MRI of Head with stable multifocal scattered T2/FLAIR hyperintensities instable appearing periventricular along with subcortical white matter nonenhancing, December 2017 . MRI of cervical spine with foci of abnormal cord signal C2-3  disc level and C6 vertebral body level and T3 level . There is multi level degenerative spondylosis , December 2017  . MRI of Head nonehancing supra and inratentorial brain parenchyma T2 white mater hyperintensities  , July 2019 . MRI cervical spine ill defined T2 hyperintensities at C2 and C6 level . Mild multi level degenertaive cahnges , July 2019 . Total absolute lymphocytes count 0.6 , October 2021      Past Medical History:   Diagnosis Date    Arthritis     Depression     Migraine without aura, without mention of intractable migraine without mention of status migrainosus     Multiple sclerosis ([address]     Optic neuritis, unspecified        Past Surgical History:   Procedure Laterality Date    SHOULDER SURGERY      bilateral rotator cuff repair    TONSILLECTOMY         Family History   Problem Relation Age of Onset    Diabetes Father     Diabetes Paternal Uncle     Diabetes Paternal Grandmother     Heart Disease Paternal Grandmother        Social History     Socioeconomic History    Marital status:      Spouse name: None    Number of children: None    Years of education: None    Highest education level: None   Tobacco Use    Smoking status: Former    Smokeless tobacco: Never    Tobacco comments:     social smoker in past   Vaping Use    Vaping Use: Never used   Substance and Sexual Activity    Alcohol use: Yes     Comment: occasional use    Drug use: No       Current Outpatient Medications   Medication Sig Dispense Refill    ocrelizumab (OCREVUS) 300 MG/10ML SOLN injection Infuse 600 mg intravenously      SUMAtriptan (IMITREX) 50 MG tablet       SUMAtriptan (IMITREX) 50 MG tablet Take at HA onset 9 tablet 3    Cholecalciferol (VITAMIN D3) 125 MCG (5000 UT) TABS Take by mouth      MethylPREDNISolone POWD 1000 mg qd x 3 days.   Put in 250 mg. per capsule. (Patient taking differently: 1000 mg qd x 3 days. Put in 250 mg. per capsule.) 3 g 0    Multiple Vitamins-Minerals (THRIVE FOR LIFE WOMENS PO) Take by mouth daily Pill, shake and patch      Lysine 1000 MG TABS Take 1 tablet by mouth as needed       TURMERIC PO Take by mouth (Patient not taking: Reported on 11/17/2022)      TECFIDERA 240 MG delayed release capsule TAKE 1 CAPSULE BY MOUTH 2 TIMES DAILY (Patient not taking: No sig reported) 60 capsule PRN    predniSONE (DELTASONE) 20 MG tablet 3 qd for 3 days, 2 qd x3 days, 1 qd x 3 day (Patient not taking: No sig reported) 18 tablet 0     No current facility-administered medications for this visit. Allergies   Allergen Reactions    Prochlorperazine Edisylate Other (See Comments)     LOCK JAW           Review of Systems   Constitutional: Negative. HENT: Negative. Eyes:  Positive for visual disturbance. Respiratory: Negative. Cardiovascular: Negative. Gastrointestinal: Negative. Endocrine: Negative. Genitourinary:  Positive for frequency. Musculoskeletal: Negative. Skin: Negative. Allergic/Immunologic: Negative. Neurological:  Positive for numbness. Hematological: Negative. Psychiatric/Behavioral: Negative. Objective:   Physical Exam  Vitals:    11/17/22 0925   BP: 117/75   Pulse: 76       Neurological Examination  Constitutional .General exam well groomed   Ears /Nose/Throat: external ear . Normal exam  Neck and thyroid . Normal size  Respiratory . Breathsounds clear bilaterally  Cardiovascular:  Auscultation of heart with regular rate and rhythm and normal heart sounds  Musculoskeletal. Muscle bulk and tone normal                                                                 Muscle strength 5/5 strength throughout                                                                                 No dysmetria or dysdiadokinesis  No tremor   Normal fine motor  Gait normal   Orientation Alert and oriented x 3 Language process and speech normal . No aphasia   Cranial nerve 2 acuety left eye 20/20, right eye 20/25 and visual fields normal   Cranial nerve 3, 4 and 6 . Extraocular muscles are intact . Pupils are equal and reactive   Cranial nerve 5 .. Intact corneal reflex. Normal facial sensation  Cranial nerve 7 normal exam   Cranial nerve 8. Grossly intact hearing   Cranial nerve 9 and 10. Symmetric palate elevation   Cranial nerve 11 , 5 out of 5 strength   Cranial Nerve 12 midline tongue . No atrophy  Sensation . Normal proprioception . Intact Vibration . Normal pinprick and light touch   Deep Tendon Reflexes normal  Plantar response flexor bilaterally      Assessment:       1. Multiple sclerosis ([facility] [address]    2.  Migraine without aura and without status migrainosus, not intractable    She has upcoming next ocrevus infusion in January to be given over six hours with benadryl , prednisone and tylenol      Plan:         As above

## 2022-11-17 NOTE — TELEPHONE ENCOUNTER
Dr Jacquelin Light saw Freda Vargas in the office today. She will be due for her Ocrevus 600 mg. maintenance dose in January. She goes to [facility]. He asked that we check with them about her orders. He wants to be sure she receives the 100 mg. of Solumedrol and 50 mg. of Benadryl IVP before her infusion and that they slow her rate to over 6 hours as they did the last one. She had had a reaction with the initial infusion.   [employer] number is [phone]

## 2022-12-14 ENCOUNTER — TELEPHONE (OUTPATIENT)
Dept: NEUROLOGY | Age: 52
End: 2022-12-14

## 2022-12-14 NOTE — TELEPHONE ENCOUNTER
Sampson Ada called in today. She said she is   Scheduled for Jan. 20, 2023 for her infusion. She said that off and on since the fall she has been dealing with respiratory issues. She recently finished a Z pack. and was better and then now she is ill again with respiratory illness. She said she usually doesn't have this amount of infections. The infection is on and off. She may have a low grade fever. She has tested negative for flu and Covid. She has been following with her PCP. She is taking Zyrtec for 5 weeks now. She just wonders if this is something that she should  be concerned about since she is due for her Ocrevus infusion on 1/20/22.

## 2022-12-14 NOTE — TELEPHONE ENCOUNTER
I called [employer] this morning and left a message for Munira to call me back if they need anything from us and that we want to dothe infusion rate slow like we did previously.

## 2022-12-15 NOTE — TELEPHONE ENCOUNTER
Message to nurse .  Tammy Alberts please make sure she has order of solumedrol 100 mg IVPB and benadryl 50 mg IVP before ocrevus and have this be given over 6 hours

## 2022-12-22 NOTE — TELEPHONE ENCOUNTER
I called and left a detailed message on the nurse line at [facility] [phone]. I advised if they need anything in writing to let us know. I believe that the Care plan already reflects the IV Solumedrol and IV Benadryl but did give the rate of infusion over 6 hours verbally.

## 2023-01-20 ENCOUNTER — TELEPHONE (OUTPATIENT)
Dept: NEUROLOGY | Age: 53
End: 2023-01-20

## 2023-01-20 DIAGNOSIS — G35 MULTIPLE SCLEROSIS (HCC): Primary | ICD-10-CM

## 2023-01-20 NOTE — TELEPHONE ENCOUNTER
[place] called in from [facility] stating that Augustine Albrecht was having a reaction to the Ocrevus infusion. She said that they were up to 100 ml an hour and the patient started to have numbness,itching and tingling at the roof of her mouth. The patient nose started to run also. Before the infusion patient received methylprednisolone 100 mg and Benadryl 50 ml. She said that the patients order states that she can run it faster but others have been running it slower. She would like to know if the orders need to be changed or she should be reevaluated. Can you please advise       [facility] phone number [phone].

## 2023-01-20 NOTE — TELEPHONE ENCOUNTER
Dr. Aman Carrera responded back to me in [employer]. If patient had a reaction even in the past; I would stop it altogether and make a sooner appointment with Dr. Tony Norris; thank you. I called Dario Caruso RN at [facility] and she took my order to stop the infusion. She said she probably only received 1/5th of the dosse of Ocrevus. I will check with Dr. Tony Norris when he returns in 2 weeks for further direction and will watch for an appt.

## 2023-01-20 NOTE — TELEPHONE ENCOUNTER
They stopped the infusion and olga dfor symptoms to subside. Then restarted it. The max rate they can go to is 100 ml/hr before she starts with the symptoms again. If it is okay with the physician they will need a verbal order to continue with the rate of 100 ml per hour. Please advise. Dr. Viet Kiser is out of town.

## 2023-02-06 NOTE — TELEPHONE ENCOUNTER
Please hold further ocrevus at this time and schedule second opinion with Dr Aysha Myles at [facility] for management

## 2023-02-08 NOTE — TELEPHONE ENCOUNTER
I called and brando Myles Gave on her voice mail to please return my call.  I believe she has been to [facility] in the past.

## 2023-02-08 NOTE — TELEPHONE ENCOUNTER
I called [facility]. She would like to discuss it with Dr. Jorden Beltran at her next appt. but would like to go ahead with referral to Dr. Amilcar Breaux. Faxed referral to [phone].

## 2023-06-05 ENCOUNTER — TELEPHONE (OUTPATIENT)
Dept: NEUROLOGY | Age: 53
End: 2023-06-05

## 2023-06-05 NOTE — TELEPHONE ENCOUNTER
Received fax from [employer] regarding benefit investigation for Ocrevus. Review of chart shows she went to [address] [place] [address] for opinion. They have recommended MRI's and the notes say  she is going to follow up with them for now.

## 2025-01-21 ENCOUNTER — HOSPITAL ENCOUNTER (OUTPATIENT)
Age: 55
Setting detail: SPECIMEN
Discharge: HOME | End: 2025-01-21

## 2025-01-21 ENCOUNTER — OFFICE VISIT (OUTPATIENT)
Dept: NEUROLOGY | Age: 55
End: 2025-01-21
Payer: COMMERCIAL

## 2025-01-21 VITALS
WEIGHT: 236 LBS | SYSTOLIC BLOOD PRESSURE: 131 MMHG | BODY MASS INDEX: 41.82 KG/M2 | DIASTOLIC BLOOD PRESSURE: 89 MMHG | HEIGHT: 63 IN | HEART RATE: 95 BPM

## 2025-01-21 DIAGNOSIS — R20.0 NUMBNESS AND TINGLING: ICD-10-CM

## 2025-01-21 DIAGNOSIS — R20.2 NUMBNESS AND TINGLING: ICD-10-CM

## 2025-01-21 DIAGNOSIS — G35 MULTIPLE SCLEROSIS (HCC): ICD-10-CM

## 2025-01-21 DIAGNOSIS — G37.3 TRANSVERSE MYELITIS (HCC): ICD-10-CM

## 2025-01-21 DIAGNOSIS — G35 MULTIPLE SCLEROSIS (HCC): Primary | ICD-10-CM

## 2025-01-21 DIAGNOSIS — Z91.89 AT RISK FOR SIDE EFFECT OF MEDICATION: ICD-10-CM

## 2025-01-21 DIAGNOSIS — E55.9 VITAMIN D DEFICIENCY: ICD-10-CM

## 2025-01-21 DIAGNOSIS — H46.9 OPTIC NEURITIS: ICD-10-CM

## 2025-01-21 LAB
25(OH)D3 SERPL-MCNC: 41.6 NG/ML (ref 30–100)
ALBUMIN SERPL-MCNC: 4.3 G/DL (ref 3.5–5.2)
ALBUMIN/GLOB SERPL: 1.4 {RATIO} (ref 1–2.5)
ALP SERPL-CCNC: 100 U/L (ref 35–104)
ALT SERPL-CCNC: 20 U/L (ref 10–35)
ANION GAP SERPL CALCULATED.3IONS-SCNC: 10 MMOL/L (ref 9–16)
AST SERPL-CCNC: 25 U/L (ref 10–35)
BASOPHILS # BLD: 0.03 K/UL (ref 0–0.2)
BASOPHILS NFR BLD: 0 % (ref 0–2)
BILIRUB SERPL-MCNC: 0.4 MG/DL (ref 0–1.2)
BUN SERPL-MCNC: 14 MG/DL (ref 6–20)
CALCIUM SERPL-MCNC: 10.2 MG/DL (ref 8.6–10.4)
CHLORIDE SERPL-SCNC: 104 MMOL/L (ref 98–107)
CO2 SERPL-SCNC: 26 MMOL/L (ref 20–31)
CREAT SERPL-MCNC: 0.8 MG/DL (ref 0.6–0.9)
EOSINOPHIL # BLD: 0.21 K/UL (ref 0–0.44)
EOSINOPHILS RELATIVE PERCENT: 3 % (ref 1–4)
ERYTHROCYTE [DISTWIDTH] IN BLOOD BY AUTOMATED COUNT: 14.5 % (ref 11.8–14.4)
FOLATE SERPL-MCNC: 34.3 NG/ML (ref 4.8–24.2)
GFR, ESTIMATED: 88 ML/MIN/1.73M2
GLUCOSE SERPL-MCNC: 88 MG/DL (ref 74–99)
HCT VFR BLD AUTO: 41.4 % (ref 36.3–47.1)
HGB BLD-MCNC: 13.5 G/DL (ref 11.9–15.1)
IMM GRANULOCYTES # BLD AUTO: 0.03 K/UL (ref 0–0.3)
IMM GRANULOCYTES NFR BLD: 0 %
LYMPHOCYTES NFR BLD: 1.03 K/UL (ref 1.1–3.7)
LYMPHOCYTES RELATIVE PERCENT: 15 % (ref 24–43)
MCH RBC QN AUTO: 28 PG (ref 25.2–33.5)
MCHC RBC AUTO-ENTMCNC: 32.6 G/DL (ref 28.4–34.8)
MCV RBC AUTO: 85.7 FL (ref 82.6–102.9)
MONOCYTES NFR BLD: 0.42 K/UL (ref 0.1–1.2)
MONOCYTES NFR BLD: 6 % (ref 3–12)
NEUTROPHILS NFR BLD: 76 % (ref 36–65)
NEUTS SEG NFR BLD: 5.13 K/UL (ref 1.5–8.1)
NRBC BLD-RTO: 0 PER 100 WBC
PLATELET # BLD AUTO: 230 K/UL (ref 138–453)
PMV BLD AUTO: 11.2 FL (ref 8.1–13.5)
POTASSIUM SERPL-SCNC: 4.3 MMOL/L (ref 3.7–5.3)
PROT SERPL-MCNC: 7.3 G/DL (ref 6.6–8.7)
RBC # BLD AUTO: 4.83 M/UL (ref 3.95–5.11)
RBC # BLD: ABNORMAL 10*6/UL
SODIUM SERPL-SCNC: 140 MMOL/L (ref 136–145)
VIT B12 SERPL-MCNC: 707 PG/ML (ref 232–1245)
WBC OTHER # BLD: 6.9 K/UL (ref 3.5–11.3)

## 2025-01-21 PROCEDURE — 99213 OFFICE O/P EST LOW 20 MIN: CPT | Performed by: PSYCHIATRY & NEUROLOGY

## 2025-01-21 RX ORDER — MAGNESIUM OXIDE 400 MG/1
400 TABLET ORAL DAILY
COMMUNITY

## 2025-01-21 NOTE — PROGRESS NOTES
[facility] NEUROLOGY  [address] [facility] [facility] [address]     Patient:  Lisa Wisdom   :  1970   MRN:  [id number]   Provider:  Mikey Borja PA      Date of Screenin2025     [facility] Screening Tool    Medical Care:                     PRESCRIPTIONS:  Do you have any difficulties with the following:  Affording Medications?   No     Picking Medications Up?  No      IN HOME CARE SERVICES:  Are you seen in your home by any of following:  Home Health?  No   Palliative Care?  No   __________________________________________________________________________________________________    Activities of Daily Living (ADLS):  Do you have difficulty with the any of the following:  Bathing?  No   Toileting?  No   Dressing?  No   Feeding Yourself?  No    Falling?  Yes - , last fall 4 weeks ago, did not hit head.  Right hip replaced 2024   Any Fears of Falling?  No   __________________________________________________________________________________________________    Insurance:  Do you have trouble affording medical care? No   I.e. Office visits, labs, tests that are ordered by your provider?   No   __________________________________________________________________________________________________    Transportation:    Have you ever gone without medical care because you did have a way to get there?  No     Food:  In the past month have you had any difficulties with the following:    Getting groceries?  No   Preparing meals?  No   Concern that you would run out of food?   No     Housing:    Are you worried about not having stable housing in the next 2 months?  No   Is taking care of your home overwhelming? (Mowing, Leaves, Snow removal, Plumbing, etc) No     Utilities:  Have you lost service or concerned about affording the following:  Electric?  No   Gas?  No   Water?  No          Safety and Isolation:    Do you physically or emotionally feel

## 2025-01-21 NOTE — PROGRESS NOTES
[facility] NEUROLOGY  [address]  [facility] [address]  Dept: [phone]    PATIENT NAME: Lisa Wisdom  PATIENT MRN: [id number]  PRIMARY CARE PHYSICIAN: Mikey Borja PA    HPI:      Lisa Wisdom is a 54 y.o. female who presents to clinic today for evaluation of relapsing multiple sclerosis. She was seen by Dr. Gisel Alex at [facility] on 4/7/2023 for an opinion on DMT management and relapsing multiple sclerosis and her history is summarized as follows:     Ms. Wisdom initially developed neurological symptoms in 2005 when she had numbness in her abdomen and bilateral lower extremities.  About 1 year later, she had impaired vision in the right eye.  At that time, she was diagnosed with optic neuritis.  She underwent a neurological evaluation including MRIs and CSF studies.  At that time, she was diagnosed with relapsing remitting multiple sclerosis.  She was treated with steroids and had a recovery of her vision to baseline.    Ms. Wisdom was started on Avonex as her first DMT.  She was not able to tolerate this due to side effects.  In 2013, she had a relapse and was started on dimethyl fumarate.  She did very well on this medication, but unfortunately developed lymphopenia.  This medication was stopped and she was monitored off disease modifying therapy for 2 years.  Once lymphocyte counts had recovered, she started ocrelizumab.  She had an infusion reaction during her first dose.  She continued to have intermittent infusion reactions.  She then developed recurrent respiratory infections including recurrent influenza.  She developed significant lymphopenia on ocrelizumab as well with an ALC of 300 at its lowest.  She was then evaluated at [facility] by Dr. Gisel Alex and ocrelizumab was stopped.  She has now been off DMT for several years.    Ms. Wisdom states that she recently saw her optometrist, Dr. Martha Abrams in [facility] and there was a concern for

## 2025-01-24 LAB
QUANTI TB GOLD PLUS: NEGATIVE
QUANTI TB1 MINUS NIL: 0.01 IU/ML
QUANTI TB2 MINUS NIL: 0.01 IU/ML
QUANTIFERON MITOGEN: 9.97 IU/ML
QUANTIFERON NIL: 0.03 IU/ML

## 2025-01-30 ENCOUNTER — HOSPITAL ENCOUNTER (OUTPATIENT)
Dept: MRI IMAGING | Age: 55
Discharge: HOME | End: 2025-02-01
Attending: PSYCHIATRY & NEUROLOGY
Payer: COMMERCIAL

## 2025-01-30 DIAGNOSIS — G35 MULTIPLE SCLEROSIS (HCC): ICD-10-CM

## 2025-01-30 DIAGNOSIS — Z91.89 AT RISK FOR SIDE EFFECT OF MEDICATION: ICD-10-CM

## 2025-01-30 DIAGNOSIS — H46.9 OPTIC NEURITIS: ICD-10-CM

## 2025-01-30 PROCEDURE — 70543 MRI ORBT/FAC/NCK W/O &W/DYE: CPT

## 2025-01-30 PROCEDURE — 2500000003 HC RX 250 WO HCPCS: Performed by: PSYCHIATRY & NEUROLOGY

## 2025-01-30 PROCEDURE — 6360000004 HC RX CONTRAST MEDICATION: Performed by: PSYCHIATRY & NEUROLOGY

## 2025-01-30 PROCEDURE — A9579 GAD-BASE MR CONTRAST NOS,1ML: HCPCS | Performed by: PSYCHIATRY & NEUROLOGY

## 2025-01-30 PROCEDURE — 70553 MRI BRAIN STEM W/O & W/DYE: CPT

## 2025-01-30 RX ORDER — SODIUM CHLORIDE 0.9 % (FLUSH) 0.9 %
10 SYRINGE (ML) INJECTION PRN
Status: DISCONTINUED | OUTPATIENT
Start: 2025-01-30 | End: 2025-02-02 | Stop reason: HOSPADM

## 2025-01-30 RX ADMIN — SODIUM CHLORIDE, PRESERVATIVE FREE 10 ML: 5 INJECTION INTRAVENOUS at 14:04

## 2025-01-30 RX ADMIN — GADOTERIDOL 20 ML: 279.3 INJECTION, SOLUTION INTRAVENOUS at 14:03

## 2025-02-06 ENCOUNTER — HOSPITAL ENCOUNTER (OUTPATIENT)
Dept: MRI IMAGING | Age: 55
Discharge: HOME | End: 2025-02-08
Attending: PSYCHIATRY & NEUROLOGY
Payer: COMMERCIAL

## 2025-02-06 DIAGNOSIS — Z91.89 AT RISK FOR SIDE EFFECT OF MEDICATION: ICD-10-CM

## 2025-02-06 DIAGNOSIS — G35 MULTIPLE SCLEROSIS (HCC): ICD-10-CM

## 2025-02-06 PROCEDURE — 72157 MRI CHEST SPINE W/O & W/DYE: CPT

## 2025-02-06 PROCEDURE — A9579 GAD-BASE MR CONTRAST NOS,1ML: HCPCS | Performed by: PSYCHIATRY & NEUROLOGY

## 2025-02-06 PROCEDURE — 6360000004 HC RX CONTRAST MEDICATION: Performed by: PSYCHIATRY & NEUROLOGY

## 2025-02-06 PROCEDURE — 72156 MRI NECK SPINE W/O & W/DYE: CPT

## 2025-02-06 RX ADMIN — GADOTERIDOL 20 ML: 279.3 INJECTION, SOLUTION INTRAVENOUS at 08:40

## 2025-03-12 NOTE — PROGRESS NOTES
visit taking the history, performing the exam, reviewing data, and counseling,  reviewing notes from other providers, labs, personally reviewing imaging, composing this note, and coordinating care.          This note was produced using voice recognition technology, and some typographical errors may be present despite our best efforts with proofreading.

## 2025-03-13 ENCOUNTER — OFFICE VISIT (OUTPATIENT)
Dept: NEUROLOGY | Age: 55
End: 2025-03-13
Payer: COMMERCIAL

## 2025-03-13 VITALS
HEART RATE: 81 BPM | DIASTOLIC BLOOD PRESSURE: 79 MMHG | SYSTOLIC BLOOD PRESSURE: 128 MMHG | WEIGHT: 233 LBS | HEIGHT: 63 IN | BODY MASS INDEX: 41.29 KG/M2

## 2025-03-13 DIAGNOSIS — H46.9 OPTIC NEURITIS: ICD-10-CM

## 2025-03-13 DIAGNOSIS — G35 MULTIPLE SCLEROSIS (HCC): Primary | ICD-10-CM

## 2025-03-13 DIAGNOSIS — Z91.89 AT RISK FOR SIDE EFFECT OF MEDICATION: ICD-10-CM

## 2025-03-13 PROCEDURE — 99214 OFFICE O/P EST MOD 30 MIN: CPT | Performed by: PSYCHIATRY & NEUROLOGY

## 2025-03-13 RX ORDER — TERIFLUNOMIDE 7 MG/1
7 TABLET, FILM COATED ORAL DAILY
Qty: 7 TABLET | Refills: 0 | Status: ACTIVE | OUTPATIENT
Start: 2025-03-13 | End: 2025-03-20

## 2025-03-13 RX ORDER — TERIFLUNOMIDE 14 MG/1
14 TABLET, FILM COATED ORAL DAILY
Qty: 30 TABLET | Refills: 11 | Status: ACTIVE | OUTPATIENT
Start: 2025-03-20 | End: 2026-03-15

## 2025-03-13 NOTE — PATIENT INSTRUCTIONS
Teriflunomide (Aubagio) is a disease modifying therapy to treat relapsing remitting multiple sclerosis.     Mechanism of Action: It is the active metabolite of Leflunomide (ARAVA) in treatment of Rheumatoid Arthritis. This once a day pill interrupts pyrimidine synthesis and works as a form of mild immunosuppression.     Efficacy, : We reviewed efficacy, safety and tolerability data from the TEMSO, TOWER and TENERE  trials, long term follow-up studies, and the [facility] center's experience with teriflunomide (Aubagio). In the TEMSO trial, 14mg teriflunomide  (ARR 0.37) decreased relapse rate by 31% compared to placebo (ARR 0.54). In the TENERE trial, no differences in risk of treatment failure (from relapse or other reasons to stop drug) were found between teriflunomide and IFNb1a (rebif). Both drugs had similar relapse rates (teriflunomide ARR 0.26, rebif  ARR 0.22). FENG (no evidence of disease activity: no attacks, no 3 month confirmed disability progression, no new / enlarged T2 bright lesions or mei+ lesions on MRI) was achieved in 23% of 14mg teriflunomide treated patients, compared to 14% of placebo patients.     Safety and Tolerability data: Common side effects of teriflunomide include diarrhea, nausea, transient hair thinning, ALT elevations and changes to blood pressure.    Teriflunomide and preganancy: Teriflunomide is preganancy category X. Patients who might consider having children in the next several years may not be the best fit for this drug. It takes up to 18-24 months for the drug to clear your system, unless a rapid elimination protocol is used to clear the drug faster. Any patient taking teriflunomide must use proper birth control.     Required screening tests: We must check a TB test (PPD or quantiferon), pregnancy test, CBC and LFTs before starting this therapy.     Saftey Monitoring: We must check monthly ALT for the first 6 months while on teriflunomide, then every 3-6 months

## 2025-04-14 ENCOUNTER — PATIENT MESSAGE (OUTPATIENT)
Dept: NEUROLOGY | Age: 55
End: 2025-04-14

## 2025-04-14 RX ORDER — SUMATRIPTAN 50 MG/1
50 TABLET, FILM COATED ORAL SEE ADMIN INSTRUCTIONS
Qty: 9 TABLET | Refills: 5 | Status: SHIPPED | OUTPATIENT
Start: 2025-04-14 | End: 2025-10-11

## 2025-05-16 DIAGNOSIS — Z91.89 AT RISK FOR SIDE EFFECT OF MEDICATION: ICD-10-CM

## 2025-05-16 DIAGNOSIS — G35 MULTIPLE SCLEROSIS (HCC): ICD-10-CM

## 2025-05-19 ENCOUNTER — RESULTS FOLLOW-UP (OUTPATIENT)
Dept: NEUROLOGY | Age: 55
End: 2025-05-19

## 2025-06-13 ENCOUNTER — HOSPITAL ENCOUNTER (OUTPATIENT)
Age: 55
Setting detail: SPECIMEN
Discharge: HOME | End: 2025-06-13

## 2025-06-13 ENCOUNTER — OFFICE VISIT (OUTPATIENT)
Dept: NEUROLOGY | Age: 55
End: 2025-06-13
Payer: COMMERCIAL

## 2025-06-13 VITALS
DIASTOLIC BLOOD PRESSURE: 77 MMHG | HEART RATE: 96 BPM | HEIGHT: 63 IN | BODY MASS INDEX: 40.22 KG/M2 | WEIGHT: 227 LBS | SYSTOLIC BLOOD PRESSURE: 129 MMHG

## 2025-06-13 DIAGNOSIS — Z91.89 AT RISK FOR SIDE EFFECT OF MEDICATION: ICD-10-CM

## 2025-06-13 DIAGNOSIS — G35 MULTIPLE SCLEROSIS (HCC): ICD-10-CM

## 2025-06-13 DIAGNOSIS — B00.1 COLD SORE: ICD-10-CM

## 2025-06-13 DIAGNOSIS — G35 MULTIPLE SCLEROSIS (HCC): Primary | ICD-10-CM

## 2025-06-13 LAB
ALBUMIN SERPL-MCNC: 4.1 G/DL (ref 3.5–5.2)
ALBUMIN/GLOB SERPL: 1.6 {RATIO} (ref 1–2.5)
ALP SERPL-CCNC: 116 U/L (ref 35–104)
ALT SERPL-CCNC: 28 U/L (ref 10–35)
ANION GAP SERPL CALCULATED.3IONS-SCNC: 10 MMOL/L (ref 9–16)
AST SERPL-CCNC: 29 U/L (ref 10–35)
BASOPHILS # BLD: 0 K/UL (ref 0–0.2)
BASOPHILS NFR BLD: 0 % (ref 0–2)
BILIRUB SERPL-MCNC: 0.6 MG/DL (ref 0–1.2)
BUN SERPL-MCNC: 11 MG/DL (ref 6–20)
CALCIUM SERPL-MCNC: 9.2 MG/DL (ref 8.6–10.4)
CHLORIDE SERPL-SCNC: 106 MMOL/L (ref 98–107)
CO2 SERPL-SCNC: 25 MMOL/L (ref 20–31)
CREAT SERPL-MCNC: 0.7 MG/DL (ref 0.6–0.9)
EOSINOPHIL # BLD: 0.22 K/UL (ref 0–0.4)
EOSINOPHILS RELATIVE PERCENT: 4 % (ref 1–4)
ERYTHROCYTE [DISTWIDTH] IN BLOOD BY AUTOMATED COUNT: 14.4 % (ref 11.8–14.4)
GFR, ESTIMATED: >90 ML/MIN/1.73M2
GLUCOSE SERPL-MCNC: 84 MG/DL (ref 74–99)
HCT VFR BLD AUTO: 40.5 % (ref 36.3–47.1)
HGB BLD-MCNC: 13.7 G/DL (ref 11.9–15.1)
IMM GRANULOCYTES # BLD AUTO: 0 K/UL (ref 0–0.3)
IMM GRANULOCYTES NFR BLD: 0 %
LYMPHOCYTES NFR BLD: 0.77 K/UL (ref 1–4.8)
LYMPHOCYTES RELATIVE PERCENT: 14 % (ref 24–44)
MCH RBC QN AUTO: 30 PG (ref 25.2–33.5)
MCHC RBC AUTO-ENTMCNC: 33.8 G/DL (ref 28.4–34.8)
MCV RBC AUTO: 88.6 FL (ref 82.6–102.9)
MONOCYTES NFR BLD: 0.61 K/UL (ref 0.1–0.8)
MONOCYTES NFR BLD: 11 % (ref 1–7)
MORPHOLOGY: NORMAL
NEUTROPHILS NFR BLD: 71 % (ref 36–66)
NEUTS SEG NFR BLD: 3.9 K/UL (ref 1.8–7.7)
NRBC BLD-RTO: 0 PER 100 WBC
PLATELET # BLD AUTO: 280 K/UL (ref 138–453)
PMV BLD AUTO: 10.8 FL (ref 8.1–13.5)
POTASSIUM SERPL-SCNC: 4.1 MMOL/L (ref 3.7–5.3)
PROT SERPL-MCNC: 6.7 G/DL (ref 6.6–8.7)
RBC # BLD AUTO: 4.57 M/UL (ref 3.95–5.11)
SODIUM SERPL-SCNC: 141 MMOL/L (ref 136–145)
WBC OTHER # BLD: 5.5 K/UL (ref 3.5–11.3)

## 2025-06-13 PROCEDURE — 99214 OFFICE O/P EST MOD 30 MIN: CPT | Performed by: PSYCHIATRY & NEUROLOGY

## 2025-06-13 RX ORDER — VALACYCLOVIR HYDROCHLORIDE 1 G/1
1000 TABLET, FILM COATED ORAL 2 TIMES DAILY
Qty: 14 TABLET | Refills: 3 | Status: SHIPPED | OUTPATIENT
Start: 2025-06-13 | End: 2025-06-20

## 2025-06-13 RX ORDER — TERIFLUNOMIDE 7 MG/1
7 TABLET, FILM COATED ORAL DAILY
Qty: 30 TABLET | Refills: 11 | Status: ACTIVE | OUTPATIENT
Start: 2025-07-13 | End: 2025-08-12

## 2025-06-13 RX ORDER — CELECOXIB 100 MG/1
100 CAPSULE ORAL 2 TIMES DAILY
COMMUNITY

## 2025-06-13 NOTE — PROGRESS NOTES
[facility] NEUROLOGY  [address]  [facility] [address]  Dept: [phone]    PATIENT NAME: Lisa Wisdom  PATIENT MRN: [id number]  PRIMARY CARE PHYSICIAN: Mikey Borja PA    History     Lisa Wisdom is a 54 y.o. female who I initially saw on 1/21/2025 for relapsing multiple sclerosis. She was seen by Dr. Gisel Alex at [facility] on 4/7/2023 for an opinion on DMT management and relapsing multiple sclerosis and her history is summarized as follows:      Ms. Wisdom initially developed neurological symptoms in 2005 when she had numbness in her abdomen and bilateral lower extremities.  About 1 year later, she had impaired vision in the right eye.  At that time, she was diagnosed with optic neuritis.  She underwent a neurological evaluation including MRIs and CSF studies.  At that time, she was diagnosed with relapsing remitting multiple sclerosis.  She was treated with steroids and had a recovery of her vision to baseline.     Ms. Wisdom was started on Avonex as her first DMT.  She was not able to tolerate this due to side effects.  In 2013, she had a relapse and was started on dimethyl fumarate.  She did very well on this medication, but unfortunately developed lymphopenia.  This medication was stopped and she was monitored off disease modifying therapy for 2 years.  Once lymphocyte counts had recovered, she started ocrelizumab.  She had an infusion reaction during her first dose.  She continued to have intermittent infusion reactions.  She then developed recurrent respiratory infections including recurrent influenza.  She developed significant lymphopenia on ocrelizumab as well with an ALC of 300 at its lowest.  She was then evaluated at [facility] by Dr. Gisel Alex and ocrelizumab was stopped.  She has now been off DMT for several years.     Ms. Wisdom states that she recently saw her optometrist, Dr. Martha Abrams in [facility] and there was a concern for left

## 2025-06-15 ENCOUNTER — RESULTS FOLLOW-UP (OUTPATIENT)
Dept: NEUROLOGY | Age: 55
End: 2025-06-15

## 2025-08-04 ENCOUNTER — HOSPITAL ENCOUNTER (OUTPATIENT)
Age: 55
Setting detail: SPECIMEN
Discharge: HOME | End: 2025-08-04

## 2025-08-04 DIAGNOSIS — Z91.89 AT RISK FOR SIDE EFFECT OF MEDICATION: ICD-10-CM

## 2025-08-04 DIAGNOSIS — G35 MULTIPLE SCLEROSIS (HCC): ICD-10-CM

## 2025-08-04 LAB — ALT SERPL-CCNC: 29 U/L (ref 10–35)

## 2025-08-25 ENCOUNTER — PATIENT MESSAGE (OUTPATIENT)
Dept: NEUROLOGY | Age: 55
End: 2025-08-25

## 2025-08-25 DIAGNOSIS — G35 MULTIPLE SCLEROSIS (HCC): Primary | ICD-10-CM

## 2025-08-26 RX ORDER — TERIFLUNOMIDE 7 MG/1
7 TABLET, FILM COATED ORAL DAILY
Qty: 30 TABLET | Refills: 11 | Status: ACTIVE | OUTPATIENT
Start: 2025-08-26 | End: 2026-08-26

## 2025-09-05 ENCOUNTER — HOSPITAL ENCOUNTER (OUTPATIENT)
Age: 55
Setting detail: SPECIMEN
Discharge: HOME | End: 2025-09-05

## 2025-09-05 DIAGNOSIS — Z91.89 AT RISK FOR SIDE EFFECT OF MEDICATION: ICD-10-CM

## 2025-09-05 DIAGNOSIS — G35 MULTIPLE SCLEROSIS (HCC): ICD-10-CM

## 2025-09-05 LAB — ALT SERPL-CCNC: 26 U/L (ref 10–35)
